# Patient Record
(demographics unavailable — no encounter records)

---

## 2025-05-14 NOTE — END OF VISIT
[Time Spent: ___ minutes] : I have spent [unfilled] minutes of time on the encounter which excludes teaching and separately reported services. [TextEntry] : Discussed with pt at length regarding MISSY, obesity, soboe; reviewed w/u with pt as above.

## 2025-05-14 NOTE — REASON FOR VISIT
[Initial] : an initial visit [Sleep Apnea] : sleep apnea [Shortness of Breath] : shortness of breath [TextBox_44] : former smoker, prior Covid infection

## 2025-05-14 NOTE — HISTORY OF PRESENT ILLNESS
[Initial Evaluation] : an initial evaluation of [Excessive Daytime Sleepiness] : excessive daytime sleepiness [Witnessed Gasping During Sleep] : witnessed gasping during sleep [Snoring] : snoring [Unrefreshing Sleep] : unrefreshing sleep [Sleepy When Sedentary] : sleepy when sedentary [Currently Experiencing] : The patient is currently experiencing symptoms. [None] : The patient is not currently being treated for this problem [TextBox_4] : Former smoker of 1 ppd x 15 years, quit 9/2024. S/p Covid infection in 2021 with mild symptoms. Patient c/o SOBOE but is otherwise without associated respiratory complaints. Reports using a friend's Albuterol with some relief. Pt reports 15 lbs since he quit smoking. C/o chest discomfort/tightness but reports negative cardiac w/u. [Witnessed Apnea During Sleep] : no witnessed apnea during sleep [ESS] : 15 [TextBox_11] : 5

## 2025-05-14 NOTE — PHYSICAL EXAM
[No Acute Distress] : no acute distress [Low Lying Soft Palate] : low lying soft palate [Elongated Uvula] : elongated uvula [Enlarged Base of the Tongue] : enlarged base of the tongue [III] : Mallampati Class: III [Normal Appearance] : normal appearance [Supple] : supple [Normal Rate/Rhythm] : normal rate/rhythm [Normal S1, S2] : normal s1, s2 [No Murmurs] : no murmurs [No Resp Distress] : no resp distress [No Acc Muscle Use] : no acc muscle use [Normal Rhythm and Effort] : normal rhythm and effort [Clear to Auscultation Bilaterally] : clear to auscultation bilaterally [No Abnormalities] : no abnormalities [Benign] : benign [Not Tender] : not tender [Soft] : soft [No Clubbing] : no clubbing [No Edema] : no edema [No Focal Deficits] : no focal deficits [Oriented x3] : oriented x3 [TextBox_11] : mod-severe oropharyngeal crowding.

## 2025-05-14 NOTE — DISCUSSION/SUMMARY
[FreeTextEntry1] :  #1. Will schedule lung function testing in near future to assess lung function. #2. The patient does not appear to require chronic BD therapy at this time but will start Albuterol inhaler as needed for now. #3. Diet and exercise for weight loss. #4. SOBOE is likely at least somewhat related to weight or deconditioning. #5. CXR to evaluate SOBOE. #6. Home PSG to evaluate for possible MISSY. #7. Consider PAP therapy for significant MISSY. #8. S/p Covid vaccines and infection. #9. Former smoker of 1 ppd x 15 years, quit 9/2024. #10. F/u in 8 weeks with HST, nick, and CXR.   The patient expressed understanding and agreement with the above recommendations/plan and accepts responsibility to be compliant with recommended testing, therapies, and f/u visits. All relevant questions and concerns were addressed.

## 2025-05-14 NOTE — REVIEW OF SYSTEMS
[Fatigue] : fatigue [Chest Tightness] : chest tightness [SOB on Exertion] : sob on exertion [Chest Discomfort] : chest discomfort [Seasonal Allergies] : seasonal allergies [Negative] : Endocrine [TextBox_30] : occ chest tightness

## 2025-05-14 NOTE — PROCEDURE
[FreeTextEntry1] : Pt denies any h/o asthma, COPD or significant smoking hx. Denies any significant respiratory complaints.

## 2025-05-14 NOTE — CONSULT LETTER
[Dear  ___] : Dear  [unfilled], [Consult Letter:] : I had the pleasure of evaluating your patient, [unfilled]. [Please see my note below.] : Please see my note below. [Consult Closing:] : Thank you very much for allowing me to participate in the care of this patient.  If you have any questions, please do not hesitate to contact me. [Sincerely,] : Sincerely, [FreeTextEntry3] : Clme Díaz MD, FCCP, D. [facility] [place]